# Patient Record
Sex: MALE
[De-identification: names, ages, dates, MRNs, and addresses within clinical notes are randomized per-mention and may not be internally consistent; named-entity substitution may affect disease eponyms.]

---

## 2019-03-16 ENCOUNTER — HOSPITAL ENCOUNTER (EMERGENCY)
Dept: HOSPITAL 31 - C.ER | Age: 44
Discharge: HOME | End: 2019-03-16
Payer: SELF-PAY

## 2019-03-16 VITALS — OXYGEN SATURATION: 99 % | RESPIRATION RATE: 18 BRPM

## 2019-03-16 VITALS — SYSTOLIC BLOOD PRESSURE: 146 MMHG | HEART RATE: 70 BPM | DIASTOLIC BLOOD PRESSURE: 106 MMHG | TEMPERATURE: 98.9 F

## 2019-03-16 VITALS — BODY MASS INDEX: 23.7 KG/M2

## 2019-03-16 DIAGNOSIS — S61.412A: Primary | ICD-10-CM

## 2019-03-16 DIAGNOSIS — Z23: ICD-10-CM

## 2019-03-16 DIAGNOSIS — S62.212A: ICD-10-CM
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
## 2019-03-16 NOTE — PCM.PROC
Procedures


Attestation:: I certify that I have explained the specified Operation(s) or 

Procedure(s), risks, benefits and reasonable alternatives to the Patient and/or 

other person responsible. The opportunity was given to ask questions and all 

questions answered





- Laceration


  ** lidocaine 1% simple, single layer stellate deep structures intact left hand

other other local infiltration simple, interrupted


Site: hand


Side (if applicable): left


Size (cm): 6


Description: stellate


Depth: simple, single layer


Anesthesia used: lidocaine 1%


Anesthesia technique: local infiltration


Amount (mLs): 10


Skin layer closed with: other (prolene)


Size: other (2.0 prolene)


Technique: simple, interrupted
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
## 2019-03-16 NOTE — RAD
Left hand three views 



HISTORY:

Trauma. 



COMPARISON:

None available. 



Findings: 



Transverse oblique mildly displaced intra-articular fracture seen at 

the ulnar base of the 1st metacarpal bone extending to the 

carpometacarpal joint space. 



Impression: 



Transverse oblique mildly displaced intra-articular fracture seen at 

the ulnar base of the 1st metacarpal bone extending to the 

carpometacarpal joint space.